# Patient Record
Sex: MALE | Race: WHITE | Employment: FULL TIME | URBAN - METROPOLITAN AREA
[De-identification: names, ages, dates, MRNs, and addresses within clinical notes are randomized per-mention and may not be internally consistent; named-entity substitution may affect disease eponyms.]

---

## 2023-06-11 ENCOUNTER — OFFICE VISIT (OUTPATIENT)
Dept: URGENT CARE | Facility: PHYSICIAN GROUP | Age: 23
End: 2023-06-11
Payer: COMMERCIAL

## 2023-06-11 VITALS
HEIGHT: 67 IN | BODY MASS INDEX: 30.61 KG/M2 | SYSTOLIC BLOOD PRESSURE: 126 MMHG | OXYGEN SATURATION: 96 % | DIASTOLIC BLOOD PRESSURE: 89 MMHG | WEIGHT: 195 LBS | RESPIRATION RATE: 14 BRPM | HEART RATE: 104 BPM | TEMPERATURE: 98.2 F

## 2023-06-11 DIAGNOSIS — R11.2 NAUSEA AND VOMITING, UNSPECIFIED VOMITING TYPE: ICD-10-CM

## 2023-06-11 DIAGNOSIS — R19.7 DIARRHEA, UNSPECIFIED TYPE: ICD-10-CM

## 2023-06-11 DIAGNOSIS — K52.9 AGE (ACUTE GASTROENTERITIS): ICD-10-CM

## 2023-06-11 PROCEDURE — 3074F SYST BP LT 130 MM HG: CPT

## 2023-06-11 PROCEDURE — 3079F DIAST BP 80-89 MM HG: CPT

## 2023-06-11 PROCEDURE — 99203 OFFICE O/P NEW LOW 30 MIN: CPT

## 2023-06-11 RX ORDER — ONDANSETRON 4 MG/1
4 TABLET, FILM COATED ORAL EVERY 6 HOURS PRN
Qty: 30 TABLET | Refills: 0 | Status: SHIPPED | OUTPATIENT
Start: 2023-06-11 | End: 2023-07-16

## 2023-06-11 ASSESSMENT — ENCOUNTER SYMPTOMS
COUGH: 0
CHILLS: 1
FEVER: 0
DIARRHEA: 1
SORE THROAT: 0
NUMBER OF EPISODES OF EMESIS TODAY: 1

## 2023-06-11 ASSESSMENT — FIBROSIS 4 INDEX: FIB4 SCORE: 0.27

## 2023-06-11 NOTE — PROGRESS NOTES
Monty Gresham is a 23 y.o. male who presents with Emesis (Last night at midnight felt sick; woke up early this morning had stomach issues. Throwing up until a few hours ago; head throbbing, throat irritated. In/out of being nauseous. )            Emesis  This is a new problem. The current episode started today. The problem occurs 2 to 4 times per day. The problem has been gradually worsening. Associated symptoms include chills. Pertinent negatives include no congestion, coughing, fever or sore throat. Nothing aggravates the symptoms. Treatments tried: zofran.   Diarrhea   This is a new problem. The current episode started today. The problem occurs 2 to 4 times per day. The problem has been gradually worsening. Associated symptoms include chills. Pertinent negatives include no coughing or fever. Nothing aggravates the symptoms. He has tried increased fluids for the symptoms.     Patient presents with symptoms that started this morning.  He had 4 episodes of diarrhea and 4 episodes of vomiting.  He describes diarrhea as watery, nonmucoid, and nonbloody.  He reports sick contacts, son has similar symptoms.  He denies any travel out of the country.    Patient's current problem list, medications, and past medical/surgical history were reviewed in Epic.    PMH:  has no past medical history on file.  MEDS: No current outpatient medications on file.  ALLERGIES: Not on File  SURGHX: No past surgical history on file.  SOCHX:  reports that he has never smoked. He has never used smokeless tobacco. He reports that he does not currently use alcohol. He reports current drug use. Drugs: Inhaled and Marijuana.  FH: Reviewed with patient, not pertinent to this visit.       Review of Systems   Constitutional:  Positive for chills. Negative for fever.   HENT:  Negative for congestion and sore throat.    Respiratory:  Negative for cough.    Gastrointestinal:  Positive for diarrhea.   All other systems reviewed and are  "negative.             Objective     BP (!) 126/90   Pulse (!) 104   Temp 36.8 °C (98.2 °F)   Resp 14   Ht 1.702 m (5' 7\")   Wt 88.5 kg (195 lb)   SpO2 96%   BMI 30.54 kg/m²      Physical Exam  Constitutional:       Appearance: Normal appearance.   HENT:      Head: Normocephalic.      Nose: Nose normal.   Eyes:      Extraocular Movements: Extraocular movements intact.   Cardiovascular:      Rate and Rhythm: Normal rate and regular rhythm.      Pulses: Normal pulses.      Heart sounds: Normal heart sounds.   Pulmonary:      Effort: Pulmonary effort is normal.      Breath sounds: Normal breath sounds.   Abdominal:      General: Abdomen is flat. Bowel sounds are normal.      Palpations: Abdomen is soft.      Tenderness: There is abdominal tenderness in the epigastric area.   Musculoskeletal:         General: Normal range of motion.      Cervical back: Normal range of motion.   Skin:     General: Skin is warm.   Neurological:      General: No focal deficit present.      Mental Status: He is alert.   Psychiatric:         Mood and Affect: Mood normal.         Behavior: Behavior normal.              Assessment & Plan        1. AGE (acute gastroenteritis)      2. Nausea and vomiting, unspecified vomiting type    - ondansetron (ZOFRAN) 4 MG Tab tablet; Take 1 Tablet by mouth every 6 hours as needed for Nausea/Vomiting.  Dispense: 30 Tablet; Refill: 0    3. Diarrhea, unspecified type    Patient's presentation is consistent with acute gastroenteritis most likely viral.  May take Zofran every 6 hours as needed for nausea and/or vomiting.  Educated on the importance of increasing oral fluid hydration to prevent dehydration from vomiting and diarrhea.  Educated importance of handwashing to prevent transmission of infection.   Discussed treatment plan with patient, he is agreeable and verbalized understanding.  Educated patient on signs and symptoms watch out for, when to return to the clinic or go to the ER.     Work note " given    Electronically Signed by EV Meredith

## 2023-06-11 NOTE — LETTER
"[facility] [place]  [address]  [place] [address]     June 11, 2023    Patient: Jose Gresham   YOB: 2000   Date of Visit: 6/11/2023       To Whom It May Concern:    Jose Gresham was seen and treated in our department on 6/11/2023.     Sincerely,     Nataliya Mcclure \"Rodrick\" EV oDrantes                   "

## 2023-07-16 ENCOUNTER — OFFICE VISIT (OUTPATIENT)
Dept: URGENT CARE | Facility: PHYSICIAN GROUP | Age: 23
End: 2023-07-16
Payer: COMMERCIAL

## 2023-07-16 VITALS
SYSTOLIC BLOOD PRESSURE: 116 MMHG | HEIGHT: 67 IN | RESPIRATION RATE: 16 BRPM | HEART RATE: 74 BPM | TEMPERATURE: 98.5 F | WEIGHT: 195 LBS | DIASTOLIC BLOOD PRESSURE: 74 MMHG | BODY MASS INDEX: 30.61 KG/M2 | OXYGEN SATURATION: 98 %

## 2023-07-16 DIAGNOSIS — J02.9 SORE THROAT: ICD-10-CM

## 2023-07-16 DIAGNOSIS — J06.9 VIRAL URI: ICD-10-CM

## 2023-07-16 LAB — S PYO DNA SPEC NAA+PROBE: NOT DETECTED

## 2023-07-16 PROCEDURE — 3074F SYST BP LT 130 MM HG: CPT | Performed by: NURSE PRACTITIONER

## 2023-07-16 PROCEDURE — 3078F DIAST BP <80 MM HG: CPT | Performed by: NURSE PRACTITIONER

## 2023-07-16 PROCEDURE — 99213 OFFICE O/P EST LOW 20 MIN: CPT | Performed by: NURSE PRACTITIONER

## 2023-07-16 PROCEDURE — 87651 STREP A DNA AMP PROBE: CPT | Performed by: NURSE PRACTITIONER

## 2023-07-16 ASSESSMENT — ENCOUNTER SYMPTOMS
SORE THROAT: 1
RESPIRATORY NEGATIVE: 1
SHORTNESS OF BREATH: 0
COUGH: 0
CARDIOVASCULAR NEGATIVE: 1
FEVER: 0
CHILLS: 1
NECK PAIN: 1

## 2023-07-16 ASSESSMENT — VISUAL ACUITY: OU: 1

## 2023-07-16 ASSESSMENT — FIBROSIS 4 INDEX: FIB4 SCORE: 0.27

## 2023-07-16 NOTE — LETTER
July 16, 2023         Patient: Jose Gresham   YOB: 2000   Date of Visit: 7/16/2023           To Whom it May Concern:    Jose Gresham was seen in my clinic on 7/16/2023 due to illness. Due to medical necessity, please excuse patient from work 7/16/2023.     If you have any questions or concerns, please don't hesitate to call.        Sincerely,           BALTA Garcia.  Electronically Signed

## 2023-07-16 NOTE — PROGRESS NOTES
"Subjective:     Jose Gresham is a 23 y.o. male who presents for Pharyngitis (Started last night, left ear pain )       Pharyngitis   This is a new problem. The current episode started yesterday. The problem has been gradually worsening. Associated symptoms include congestion, ear pain and neck pain. Pertinent negatives include no coughing or shortness of breath. He has tried nothing for the symptoms.     Denies concern for Covid.    Young daughter ill with fever and will be seen at other clinic.    Review of Systems   Constitutional:  Positive for chills. Negative for fever.   HENT:  Positive for congestion, ear pain and sore throat.    Respiratory: Negative.  Negative for cough and shortness of breath.    Cardiovascular: Negative.    Musculoskeletal:  Positive for neck pain.   All other systems reviewed and are negative.    Refer to HPI for additional details.    During this visit, appropriate PPE was worn, and hand hygiene was performed.    PMH:  has no past medical history on file.    MEDS: No current outpatient medications on file.    ALLERGIES: Not on File  SURGHX: History reviewed. No pertinent surgical history.  SOCHX:  reports that he has never smoked. He has never used smokeless tobacco. He reports that he does not currently use alcohol. He reports current drug use. Drugs: Inhaled and Marijuana.    FH: Per HPI as applicable/pertinent.      Objective:     /74 (BP Location: Right arm, Patient Position: Sitting, BP Cuff Size: Large adult)   Pulse 74   Temp 36.9 °C (98.5 °F) (Temporal)   Resp 16   Ht 1.702 m (5' 7\")   Wt 88.5 kg (195 lb)   SpO2 98%   BMI 30.54 kg/m²     Physical Exam  Nursing note reviewed.   Constitutional:       General: He is not in acute distress.     Appearance: He is well-developed. He is not ill-appearing or toxic-appearing.   HENT:      Right Ear: No mastoid tenderness. Tympanic membrane is not perforated, erythematous or bulging.      Left Ear: No mastoid tenderness. " Tympanic membrane is injected. Tympanic membrane is not perforated, erythematous or bulging.      Mouth/Throat:      Mouth: Mucous membranes are moist.      Pharynx: Uvula midline. Pharyngeal swelling and posterior oropharyngeal erythema present. No oropharyngeal exudate.   Eyes:      General: Vision grossly intact.   Cardiovascular:      Rate and Rhythm: Normal rate.   Pulmonary:      Effort: Pulmonary effort is normal. No respiratory distress.      Comments: Phonation normal, speaks in full sentences, no audible wheeze or stridor   Musculoskeletal:         General: No deformity. Normal range of motion.      Cervical back: Neck supple. Tenderness (Bilateral posterior, left anterior) present.   Lymphadenopathy:      Cervical: No cervical adenopathy.   Skin:     General: Skin is warm and dry.      Coloration: Skin is not pale.   Neurological:      Mental Status: He is alert and oriented to person, place, and time.      Motor: No weakness.   Psychiatric:         Behavior: Behavior normal. Behavior is cooperative.     POCT [employer] A Strep by PCR: negative      Assessment/Plan:     1. Sore throat  - POCT Cepheid [employer] A Strep - PCR    2. Viral URI    Discussed likely self-limiting viral etiology and expected course and duration of illness. Vital signs stable, afebrile, no acute distress at this time.     Differential diagnosis, natural history, emphasizing supportive care, rest, fluids, over-the-counter symptom management per 's instructions, ibuprofen, APAP, close monitoring, and indications for immediate follow-up discussed.     Warning signs reviewed. Return precautions discussed.     All questions answered. Patient agrees with the plan of care.    Discharge summary provided.    Work note provided.

## 2024-03-10 ENCOUNTER — OFFICE VISIT (OUTPATIENT)
Dept: URGENT CARE | Facility: PHYSICIAN GROUP | Age: 24
End: 2024-03-10
Payer: COMMERCIAL

## 2024-03-10 ENCOUNTER — HOSPITAL ENCOUNTER (EMERGENCY)
Facility: MEDICAL CENTER | Age: 24
End: 2024-03-10
Attending: EMERGENCY MEDICINE
Payer: COMMERCIAL

## 2024-03-10 ENCOUNTER — APPOINTMENT (OUTPATIENT)
Dept: RADIOLOGY | Facility: MEDICAL CENTER | Age: 24
End: 2024-03-10
Attending: EMERGENCY MEDICINE
Payer: COMMERCIAL

## 2024-03-10 VITALS
SYSTOLIC BLOOD PRESSURE: 127 MMHG | TEMPERATURE: 97.8 F | BODY MASS INDEX: 31.04 KG/M2 | HEIGHT: 67 IN | HEART RATE: 75 BPM | WEIGHT: 197.75 LBS | DIASTOLIC BLOOD PRESSURE: 68 MMHG | RESPIRATION RATE: 20 BRPM | OXYGEN SATURATION: 95 %

## 2024-03-10 VITALS
DIASTOLIC BLOOD PRESSURE: 70 MMHG | BODY MASS INDEX: 32.02 KG/M2 | RESPIRATION RATE: 18 BRPM | WEIGHT: 204 LBS | TEMPERATURE: 98.2 F | OXYGEN SATURATION: 95 % | HEART RATE: 91 BPM | SYSTOLIC BLOOD PRESSURE: 118 MMHG | HEIGHT: 67 IN

## 2024-03-10 DIAGNOSIS — R06.02 SOB (SHORTNESS OF BREATH): ICD-10-CM

## 2024-03-10 DIAGNOSIS — Z82.49 FAMILY HISTORY OF AORTIC VALVE DISORDER: ICD-10-CM

## 2024-03-10 DIAGNOSIS — R07.89 CHEST TIGHTNESS: ICD-10-CM

## 2024-03-10 DIAGNOSIS — R68.84 JAW PAIN: ICD-10-CM

## 2024-03-10 LAB
ALBUMIN SERPL BCP-MCNC: 4.6 G/DL (ref 3.2–4.9)
ALBUMIN/GLOB SERPL: 1.5 G/DL
ALP SERPL-CCNC: 73 U/L (ref 30–99)
ALT SERPL-CCNC: 29 U/L (ref 2–50)
ANION GAP SERPL CALC-SCNC: 11 MMOL/L (ref 7–16)
AST SERPL-CCNC: 28 U/L (ref 12–45)
BASOPHILS # BLD AUTO: 0.4 % (ref 0–1.8)
BASOPHILS # BLD: 0.02 K/UL (ref 0–0.12)
BILIRUB SERPL-MCNC: 0.5 MG/DL (ref 0.1–1.5)
BUN SERPL-MCNC: 14 MG/DL (ref 8–22)
CALCIUM ALBUM COR SERPL-MCNC: 8.6 MG/DL (ref 8.5–10.5)
CALCIUM SERPL-MCNC: 9.1 MG/DL (ref 8.5–10.5)
CHLORIDE SERPL-SCNC: 106 MMOL/L (ref 96–112)
CO2 SERPL-SCNC: 22 MMOL/L (ref 20–33)
CREAT SERPL-MCNC: 0.83 MG/DL (ref 0.5–1.4)
EKG IMPRESSION: NORMAL
EOSINOPHIL # BLD AUTO: 0.1 K/UL (ref 0–0.51)
EOSINOPHIL NFR BLD: 2.1 % (ref 0–6.9)
ERYTHROCYTE [DISTWIDTH] IN BLOOD BY AUTOMATED COUNT: 36.6 FL (ref 35.9–50)
GFR SERPLBLD CREATININE-BSD FMLA CKD-EPI: 124 ML/MIN/1.73 M 2
GLOBULIN SER CALC-MCNC: 3 G/DL (ref 1.9–3.5)
GLUCOSE SERPL-MCNC: 93 MG/DL (ref 65–99)
HCT VFR BLD AUTO: 44.5 % (ref 42–52)
HGB BLD-MCNC: 16.4 G/DL (ref 14–18)
IMM GRANULOCYTES # BLD AUTO: 0.01 K/UL (ref 0–0.11)
IMM GRANULOCYTES NFR BLD AUTO: 0.2 % (ref 0–0.9)
LYMPHOCYTES # BLD AUTO: 1.68 K/UL (ref 1–4.8)
LYMPHOCYTES NFR BLD: 35 % (ref 22–41)
MCH RBC QN AUTO: 31.4 PG (ref 27–33)
MCHC RBC AUTO-ENTMCNC: 36.9 G/DL (ref 32.3–36.5)
MCV RBC AUTO: 85.1 FL (ref 81.4–97.8)
MONOCYTES # BLD AUTO: 0.34 K/UL (ref 0–0.85)
MONOCYTES NFR BLD AUTO: 7.1 % (ref 0–13.4)
NEUTROPHILS # BLD AUTO: 2.65 K/UL (ref 1.82–7.42)
NEUTROPHILS NFR BLD: 55.2 % (ref 44–72)
NRBC # BLD AUTO: 0 K/UL
NRBC BLD-RTO: 0 /100 WBC (ref 0–0.2)
NT-PROBNP SERPL IA-MCNC: 84 PG/ML (ref 0–125)
PLATELET # BLD AUTO: 239 K/UL (ref 164–446)
PMV BLD AUTO: 9.6 FL (ref 9–12.9)
POTASSIUM SERPL-SCNC: 4 MMOL/L (ref 3.6–5.5)
PROT SERPL-MCNC: 7.6 G/DL (ref 6–8.2)
RBC # BLD AUTO: 5.23 M/UL (ref 4.7–6.1)
SODIUM SERPL-SCNC: 139 MMOL/L (ref 135–145)
TROPONIN T SERPL-MCNC: <6 NG/L (ref 6–19)
WBC # BLD AUTO: 4.8 K/UL (ref 4.8–10.8)

## 2024-03-10 PROCEDURE — 99283 EMERGENCY DEPT VISIT LOW MDM: CPT

## 2024-03-10 PROCEDURE — 99215 OFFICE O/P EST HI 40 MIN: CPT

## 2024-03-10 PROCEDURE — 71045 X-RAY EXAM CHEST 1 VIEW: CPT

## 2024-03-10 PROCEDURE — 93005 ELECTROCARDIOGRAM TRACING: CPT

## 2024-03-10 PROCEDURE — 80053 COMPREHEN METABOLIC PANEL: CPT

## 2024-03-10 PROCEDURE — 3078F DIAST BP <80 MM HG: CPT

## 2024-03-10 PROCEDURE — 36415 COLL VENOUS BLD VENIPUNCTURE: CPT

## 2024-03-10 PROCEDURE — 3074F SYST BP LT 130 MM HG: CPT

## 2024-03-10 PROCEDURE — 83880 ASSAY OF NATRIURETIC PEPTIDE: CPT

## 2024-03-10 PROCEDURE — 93005 ELECTROCARDIOGRAM TRACING: CPT | Performed by: EMERGENCY MEDICINE

## 2024-03-10 PROCEDURE — 84484 ASSAY OF TROPONIN QUANT: CPT

## 2024-03-10 PROCEDURE — 85025 COMPLETE CBC W/AUTO DIFF WBC: CPT

## 2024-03-10 ASSESSMENT — ENCOUNTER SYMPTOMS
COUGH: 0
CONSTIPATION: 1
PALPITATIONS: 0
VOMITING: 1
NAUSEA: 1
SHORTNESS OF BREATH: 1
DIARRHEA: 0
FEVER: 0
HEADACHES: 1
HEARTBURN: 1

## 2024-03-10 ASSESSMENT — FIBROSIS 4 INDEX
FIB4 SCORE: 0.28
FIB4 SCORE: 0.28

## 2024-03-10 NOTE — ED TRIAGE NOTES
"Chief Complaint   Patient presents with    Sent from Urgent Care     Chest tightness, SOB, jaw pain since yesterday morning.     Chest Pain    Shortness of Breath     Ambulatory to triage for above. Denies CP currently. Reports it is a tightness.     BP (!) 155/91   Pulse 92   Temp 36.6 °C (97.8 °F) (Temporal)   Resp 16   Ht 1.702 m (5' 7\")   Wt 89.7 kg (197 lb 12 oz)   SpO2 97%   BMI 30.97 kg/m²     "

## 2024-03-10 NOTE — PROGRESS NOTES
Subjective:     CHIEF COMPLAINT  Chief Complaint   Patient presents with    Ear Pain     Right x 1 day     Chest Pain     No pain,just tightness        HPI  Jose Gersham is a very pleasant 24 y.o. male who presents with chest tightness, shortness of breath, right-sided jaw pain, and left arm discomfort for the past 24 hours.  He reports that his symptoms were preceded by eating fried chicken and he does have a history of heartburn.  He tried taking Pepto-Bismol without improvement in symptoms. He states that his heartburn/indigestion typically does not last this long.  He has also tried taking Tylenol for his jaw pain without improvement.  He denies any other events that preceded the onset of the symptoms.  He denies any leg swelling or palpitations.  He has a family history of cardiovascular disease.  His father  at age 51 related to an aortic valve abnormality.  His uncle  at age 43 of cardiac origins and his grandfather  at age 56 of cardiac issues as well.  Patient denies a history of high blood pressure, hyperlipidemia, diabetes, or smoking.      REVIEW OF SYSTEMS  Review of Systems   Constitutional:  Negative for fever.   Respiratory:  Positive for shortness of breath. Negative for cough.    Cardiovascular:  Positive for chest pain (Chest tightness). Negative for palpitations and leg swelling.   Gastrointestinal:  Positive for constipation, heartburn, nausea and vomiting. Negative for diarrhea.   Neurological:  Positive for headaches (R temporal region).       PAST MEDICAL HISTORY  There are no problems to display for this patient.      SURGICAL HISTORY  patient denies any surgical history    ALLERGIES  No Known Allergies    CURRENT MEDICATIONS  Home Medications       Reviewed by Katty Carreon P.A.-C. (Physician Assistant) on 03/10/24 at 0934  Med List Status: <None>     Medication Last Dose Status        Patient Antonino Taking any Medications                           SOCIAL HISTORY  Social  "History     Tobacco Use    Smoking status: Never    Smokeless tobacco: Never   Vaping Use    Vaping Use: Former    Substances: Nicotine   Substance and Sexual Activity    Alcohol use: Not Currently    Drug use: Yes     Types: Inhaled, Marijuana    Sexual activity: Not on file       FAMILY HISTORY  History reviewed. No pertinent family history.       Objective:     VITAL SIGNS: /70 (BP Location: Right arm, Patient Position: Sitting, BP Cuff Size: Adult)   Pulse 91   Temp 36.8 °C (98.2 °F) (Temporal)   Resp 18   Ht 1.702 m (5' 7\")   Wt 92.5 kg (204 lb)   SpO2 95%   BMI 31.95 kg/m²     PHYSICAL EXAM  Physical Exam  Vitals reviewed. Exam conducted with a chaperone present.   Constitutional:       General: He is not in acute distress.     Appearance: Normal appearance. He is not ill-appearing, toxic-appearing or diaphoretic.   HENT:      Head: Normocephalic and atraumatic.      Right Ear: Tympanic membrane, ear canal and external ear normal.      Mouth/Throat:      Mouth: Mucous membranes are moist.   Eyes:      Conjunctiva/sclera: Conjunctivae normal.   Cardiovascular:      Rate and Rhythm: Normal rate and regular rhythm.      Pulses:           Radial pulses are 2+ on the right side and 2+ on the left side.      Heart sounds: Normal heart sounds, S1 normal and S2 normal. No murmur heard.     No friction rub. No gallop.      Comments: No JVD  Pulmonary:      Effort: Pulmonary effort is normal. No respiratory distress.      Breath sounds: Normal breath sounds. No stridor. No wheezing, rhonchi or rales.   Musculoskeletal:      Right lower leg: No edema.      Left lower leg: No edema.   Skin:     General: Skin is warm and dry.      Capillary Refill: Capillary refill takes less than 2 seconds.      Coloration: Skin is not pale.   Neurological:      General: No focal deficit present.      Mental Status: He is alert.   Psychiatric:         Mood and Affect: Mood normal.         Assessment/Plan:     1. Chest " tightness    2. Jaw pain    3. SOB (shortness of breath)  -Patient referred to emergency department for further investigation and management    MDM/Comments:  Patient has stable vital signs and is non-toxic appearing.  Discussed that patient's symptoms cannot be safely ruled out as being cardiac in origin in an urgent care setting.  Patient has politely declined having an EKG performed in the office today and said he would just have it done in the emergency department.  [facility] transfer Cooper called ahead to inform of impending transfer.  Patient is in no acute distress at this time and is stable to be transported via private vehicle driven by his partner.  Patient demonstrated understanding of treatment plan at this time.      Differential diagnosis, natural history, supportive care, and indications for immediate follow-up discussed. All questions answered. Patient agrees with the plan of care.    Follow-up as needed if symptoms worsen or fail to improve to PCP, Urgent care or Emergency Room.    I have personally reviewed prior external notes and test results pertinent to today's visit.  I have independently reviewed and interpreted all diagnostics ordered during this urgent care acute visit.   Discussed management options (risks,benefits, and alternatives to treatment). Pt expresses understanding and the treatment plan was agreed upon. Questions were encouraged and answered to pt's satisfaction.    Please note that this dictation was created using voice recognition software. I have made a reasonable attempt to correct obvious errors, but I expect that there are errors of grammar and possibly content that I did not discover before finalizing the note.

## 2024-03-10 NOTE — LETTER
March 10, 2024    To Whom It May Concern:         This is confirmation that Jose Gresham attended his scheduled appointment with Katty Carreon P.A.-C. on 3/10/24. Please excuse his absence from work today.          If you have any questions please do not hesitate to call me at the phone number listed below.    Sincerely,          Katty Carreon P.A.-C.  [phone]

## 2024-03-10 NOTE — ED PROVIDER NOTES
Emergency Physician Note    Chief Concern:  Chief Complaint   Patient presents with    Sent from Urgent Care     Chest tightness, SOB, jaw pain since yesterday morning.     Chest Pain    Shortness of Breath     HPI/ROS     External Records Reviewed:  Outpatient clinic note reviewed: Mr. Gresham was seen in urgent care earlier today.  Physician assistant note reviewed from that visit.  He presented to urgent care reporting chest tightness, right-sided jaw pain, and left arm discomfort for 24 hours.  Reported no personal history of cardiovascular disease, but reported a strong family history of cardiovascular disease including uncle and grandfather who  of cardiac disease in their 40s and 50s respectively.  He was sent to the emergency department for further evaluation.    HPI:  Jose Gresham is a 24 y.o. male who presents to the emergency department today for evaluation of chest discomfort.  Symptoms began about 24 hours ago, he describes a sensation consistent with chest tightness, and did notice some pain radiating to the right side of the jaw.  Yesterday he had some pain that seem to radiate towards the left arm, but states he has had similar left arm discomfort in the past.  He did not notice an exertional component, did have some mild associated shortness of breath.  Chest discomfort persisted throughout the day today prompting him to go to urgent care.  He has not had any stuttering symptoms, no history of previous similar symptoms.    He reports family history of cardiac complications, he had 1 family member who  of COVID related cardiac complications, another family member with what sounds like an aortic dissection or aneurysm rupture, and another who  of cancer related cardiac complications.  He reports a history of aortic abnormality in his family, has never been screened for this, but states that multiple family members have been diagnosed, including his brother.  He is uncertain as to the specific  "condition, but what he describes sounds as though it may be a bicuspid aortic valve.    He has had no recent fevers, no cough, no congestion.  On review of systems he has had some pain localized to the right ear radiating to the right jaw, which is initially what he attributed his symptoms to.  He does not have any severe ear pain, no drainage from the ear, no other upper respiratory symptoms.  He reports no recent immobilization, no leg pain or leg swelling, does not report any history of DVT nor pulmonary embolism.    PAST MEDICAL HISTORY  History reviewed. No pertinent past medical history.    SURGICAL HISTORY  History reviewed. No pertinent surgical history.    FAMILY HISTORY  No family history on file.    SOCIAL HISTORY   reports that he has never smoked. He has never used smokeless tobacco. He reports that he does not currently use alcohol. He reports current drug use. Drugs: Inhaled and Marijuana.    CURRENT MEDICATIONS  There are no discharge medications for this patient.      ALLERGIES  Patient has no known allergies.    PHYSICAL EXAM  Vital Signs: /68   Pulse 75   Temp 36.6 °C (97.8 °F)   Resp 20   Ht 1.702 m (5' 7\")   Wt 89.7 kg (197 lb 12 oz)   SpO2 95%   BMI 30.97 kg/m²   Constitutional: Alert, no acute distress  HENT: Normocephalic, atraumatic.  Cardiovascular: No tachycardia, regular rate and rhythm, no murmur appreciated  Pulmonary: No respiratory distress, normal work of breathing, breath sounds clear and equal bilaterally  Abdomen: Soft, non tender, no peritoneal signs.  Skin: Warm, dry, no rashes or lesions  Musculoskeletal: Normal range of motion in all extremities, no swelling or deformity noted  Neurologic: Alert, oriented, normal motor function, no speech deficits    Diagnostic Studies & Procedures    Labs:  All labs reviewed by me as noted below.    EK Lead EKG interpreted by me as noted below  Results for orders placed or performed during the hospital encounter of 03/10/24 "   EKG   Result Value Ref Range    Report       [facility].    Test Date:  2024-03-10  Pt Name:    SAMANTHA FORREST                  Department: ER  MRN:        [id number]                      Room:  Gender:     Male                         Technician: [id number]  :        2000                   Requested By:ER TRIAGE PROTOCOL  Order #:    [id number]                    Reading MD: NICOLE JOSHUA MD    Measurements  Intervals                                Axis  Rate:       96                           P:          83  NJ:         135                          QRS:        60  QRSD:       98                           T:          -5  QT:         358  QTc:        453    Interpretive Statements  Rate 96, sinus rhythm, no ST elevation or depression, no delta waves, slight  T wave inversion and flattening in leads III and aVF, no prior EKG available  for comparison.  Electronically Signed On 03- 12:43:49 PDT by NICOLE JOSHUA MD         Radiology:  The attending Emergency Physician has independently interpreted the following imaging:  I independently interpreted the chest x-ray, normal-appearing mediastinum, no cardiomegaly present.    DX-CHEST-PORTABLE (1 VIEW)   Final Result      No evidence of acute cardiopulmonary process.          Course and Medical Decision Making    Initial Assessment and Plan:  Mr. Hayden presents to the emergency department today for evaluation of chest discomfort with some pain rating towards the jaw.  He also reports some pain initially localized to the ear which seem to be related to his jaw pain.  He reports some discomfort radiating towards the left arm as well, though not necessarily related to his chest pain, and he reports the left arm pain has happened in the past without any associated chest symptoms.  His vital signs are reassuring on arrival, he is not having any stuttering pain, no worsening pain, no diaphoresis.  He does not report any thoracic  back pain.  Of concern is his significant family history of cardiac disease, though it sounds as though family history is of aortic and valvular disease, not acute coronary syndrome, and it does not sound as though he has a past family history significant for familial hypercholesterolemia.  He is uncertain as to the exact name of the condition in his family, though by his description it sounds as though it may be a bicuspid aortic valve.    He was seen at urgent care and sent to the emergency department for further evaluation.  On arrival he is PERC negative, with no risk factors for pulmonary embolism, do not believe further evaluation for PE is necessary.  EKG is reassuring without evidence of acute ischemic change.  He has no abnormalities on CBC, no evidence of symptomatic anemia.  proBNP is within normal limits, no evidence of new onset fluid overload or heart failure.  High-sensitivity troponin is negative, with ongoing symptoms for 24 hours that have not changed, do not believe 2-hour repeat troponin is necessary, do not believe his symptoms represent myocardial injury.  CMP does not show any signs of electrolyte abnormality.    He is not currently established with a primary care physician, I discussed his presentation with Dr. Marcos, cardiologist, regarding his family history to see if there were any additional recommendations beyond screening echocardiogram.  At this time, Dr. Marcos is in agreement that the patient does not require any inpatient diagnostics nor treatment, nor does he require cardiology referral at this time.  He may follow-up with primary care for cardiac screening, with referral if any abnormal findings identified.    Follow-up order was placed for primary care, he was provided with follow-up information to schedule a primary care appointment as well.  He remained well-appearing in the emergency department with no new or worsening symptoms.  Heart score is 1 for family history risk,  indicating low risk of major adverse cardiac event.  Do believe he is safe for discharge home with low risk chest pain. Return precautions were discussed with the patient, and provided in written form with the patient's discharge instructions.       Additional Problems and Disposition    I have discussed management of the patient with the following physicians:   Dr. Marcos, Cardiologist    Escalation of care considered, and ultimately not performed:   D-dimer and CTA imaging initially considered, however patient is PERC negative, he has a low pretest probability for pulmonary embolism, do not believe escalation to include further blood work and imaging is necessary at this time.  Hospitalization was initially considered, however given reassuring lab work and imaging, heart score of 1, and patient with minimal cardiac risk factors, and no red flag symptoms for aortic dissection, do not believe hospitalization is necessary at this time.    Decision tools utilized including but not limited to:  PERC - negative  HEART score - 1, low risk of major adverse cardiac event    Disposition:  Discharged in stable condition    FINAL IMPRESSION   1. Chest tightness    2. Family history of aortic valve disorder        FOLLOW UP:  [facility]  [address] [facility] [address]  [phone]  Schedule an appointment as soon as possible for a visit       [place] [address] [facility] [address]  [phone]  Schedule an appointment as soon as possible for a visit       [facility]  [address] [place] [address] [address]  [facility] [address]  [phone]        [facility], Emergency Dept  [address] [facility] [id number] [phone]  Go to   If symptoms worsen

## 2024-03-10 NOTE — DISCHARGE INSTRUCTIONS
Please call your primary care doctor tomorrow for complete recheck within 24 hours.  Return to the emergency department if you develop any new or worsening symptoms including recurrent chest pain, shortness of breath, fevers, cough, leg pain or leg swelling, lightheadedness, nausea, or any further concerns.  If your symptoms have not completely improved within 8-12 hours and you are not able to follow-up with primary care, please return to the emergency department for complete recheck.

## 2024-03-11 ENCOUNTER — PATIENT OUTREACH (OUTPATIENT)
Dept: SCHEDULING | Facility: IMAGING CENTER | Age: 24
End: 2024-03-11
Payer: COMMERCIAL

## 2024-03-12 ENCOUNTER — PATIENT OUTREACH (OUTPATIENT)
Dept: SCHEDULING | Facility: IMAGING CENTER | Age: 24
End: 2024-03-12
Payer: COMMERCIAL

## 2024-03-13 ENCOUNTER — PATIENT OUTREACH (OUTPATIENT)
Dept: SCHEDULING | Facility: IMAGING CENTER | Age: 24
End: 2024-03-13
Payer: COMMERCIAL

## 2024-04-16 ENCOUNTER — OFFICE VISIT (OUTPATIENT)
Dept: URGENT CARE | Facility: PHYSICIAN GROUP | Age: 24
End: 2024-04-16
Payer: COMMERCIAL

## 2024-04-16 VITALS
WEIGHT: 197 LBS | HEART RATE: 98 BPM | OXYGEN SATURATION: 100 % | HEIGHT: 67 IN | RESPIRATION RATE: 16 BRPM | SYSTOLIC BLOOD PRESSURE: 118 MMHG | DIASTOLIC BLOOD PRESSURE: 80 MMHG | TEMPERATURE: 98 F | BODY MASS INDEX: 30.92 KG/M2

## 2024-04-16 DIAGNOSIS — J06.9 UPPER RESPIRATORY TRACT INFECTION, UNSPECIFIED TYPE: ICD-10-CM

## 2024-04-16 LAB
FLUAV RNA SPEC QL NAA+PROBE: NEGATIVE
FLUBV RNA SPEC QL NAA+PROBE: NEGATIVE
RSV RNA SPEC QL NAA+PROBE: NEGATIVE
SARS-COV-2 RNA RESP QL NAA+PROBE: NEGATIVE

## 2024-04-16 PROCEDURE — 99213 OFFICE O/P EST LOW 20 MIN: CPT

## 2024-04-16 PROCEDURE — 0241U POCT CEPHEID COV-2, FLU A/B, RSV - PCR: CPT

## 2024-04-16 PROCEDURE — 3079F DIAST BP 80-89 MM HG: CPT

## 2024-04-16 PROCEDURE — 3074F SYST BP LT 130 MM HG: CPT

## 2024-04-16 ASSESSMENT — FIBROSIS 4 INDEX: FIB4 SCORE: 0.57

## 2024-04-16 NOTE — PROGRESS NOTES
"Chief Complaint   Patient presents with    Coronavirus Screening     Exposed to COVID   Sore throat, headache, onset yesterday            Subjective:   HISTORY OF PRESENT ILLNESS: Jose Gresham is a 24 y.o. male who presents for sore throat, cough, nasal congestion and covid exposure.  He has had a headache all day today.    Patient denies fevers, SOB.  Would like covid screening, his wife is pregnant    Medications, Allergies, current problem list, Social and Family history reviewed today in Epic.     Objective:     /80 (BP Location: Right arm, Patient Position: Sitting, BP Cuff Size: Adult)   Pulse 98   Temp 36.7 °C (98 °F) (Temporal)   Resp 16   Ht 1.702 m (5' 7\")   Wt 89.4 kg (197 lb)   SpO2 100%     Physical Exam  Vitals reviewed.   Constitutional:       Appearance: Normal appearance. He is not toxic-appearing.   HENT:      Head:      Jaw: No trismus.      Right Ear: Tympanic membrane normal.      Left Ear: Tympanic membrane normal.      Nose: Rhinorrhea present.      Mouth/Throat:      Mouth: Mucous membranes are moist.      Pharynx: Uvula midline. Posterior oropharyngeal erythema present. No pharyngeal swelling, oropharyngeal exudate or uvula swelling.      Tonsils: No tonsillar exudate or tonsillar abscesses. 1+ on the right. 1+ on the left.      Comments: No muffled voice, trismus, unilateral deviation of the uvula, soft palate fullness or edema. No oral airway compromise, or drooling noted.   Eyes:      Conjunctiva/sclera: Conjunctivae normal.   Cardiovascular:      Rate and Rhythm: Normal rate and regular rhythm.      Heart sounds: Normal heart sounds.   Pulmonary:      Effort: Pulmonary effort is normal. No respiratory distress.      Breath sounds: Normal breath sounds. No decreased breath sounds or wheezing.   Musculoskeletal:      Cervical back: Full passive range of motion without pain and neck supple.   Skin:     General: Skin is warm and dry.   Neurological:      Mental Status: He is alert " and oriented to person, place, and time.   Psychiatric:         Mood and Affect: Mood normal.          Assessment/Plan:     Diagnosis and associated orders    I personally reviewed prior external notes and test results pertinent to today's visit.     1. Upper respiratory tract infection, unspecified type  POCT CoV-2, Flu A/B, RSV by PCR            IMPRESSION:  Pt has stable vital signs and no red flag symptoms or exam findings identified.   Informed pt that symptoms are consistent with a viral illness and are self limiting.  Informed that no antibiotics are indicated at this time.  Educated on duration of illness and indications to RTC.  Recommended supportive therapies and preferred OTC medications for symptomatic relief .  Advised to rest an dpush oral fluids    Differential diagnosis discussed. Pt was Educated on red flag symptoms. Pt has been Instructed to return to Urgent Care or nearest Emergency Department if symptoms fail to improve, for any change in condition, further concerns, or new concerning symptoms. Patient states understanding of the plan of care and discharge instructions.  They are discharged in stable condition.         Please note that this dictation was created using voice recognition software. I have made a reasonable attempt to correct obvious errors, but I expect that there are errors of grammar and possibly content that I did not discover before finalizing the note.    This note was electronically signed by FABY Bauman

## 2024-12-28 ENCOUNTER — HOSPITAL ENCOUNTER (EMERGENCY)
Facility: MEDICAL CENTER | Age: 24
End: 2024-12-28
Attending: EMERGENCY MEDICINE
Payer: COMMERCIAL

## 2024-12-28 ENCOUNTER — APPOINTMENT (OUTPATIENT)
Dept: RADIOLOGY | Facility: MEDICAL CENTER | Age: 24
End: 2024-12-28
Attending: EMERGENCY MEDICINE
Payer: COMMERCIAL

## 2024-12-28 VITALS
HEART RATE: 76 BPM | DIASTOLIC BLOOD PRESSURE: 77 MMHG | RESPIRATION RATE: 18 BRPM | BODY MASS INDEX: 30.93 KG/M2 | TEMPERATURE: 98.3 F | SYSTOLIC BLOOD PRESSURE: 120 MMHG | HEIGHT: 67 IN | WEIGHT: 197.09 LBS | OXYGEN SATURATION: 95 %

## 2024-12-28 DIAGNOSIS — M25.462 SWELLING OF JOINT, KNEE, LEFT: ICD-10-CM

## 2024-12-28 DIAGNOSIS — I83.90 ASYMPTOMATIC VARICOSE VEINS: ICD-10-CM

## 2024-12-28 PROCEDURE — 99283 EMERGENCY DEPT VISIT LOW MDM: CPT

## 2024-12-28 PROCEDURE — 93971 EXTREMITY STUDY: CPT | Mod: LT

## 2024-12-28 RX ORDER — KETOROLAC TROMETHAMINE 10 MG/1
10 TABLET, FILM COATED ORAL 3 TIMES DAILY PRN
Qty: 15 TABLET | Refills: 0 | Status: SHIPPED | OUTPATIENT
Start: 2024-12-28

## 2024-12-28 ASSESSMENT — FIBROSIS 4 INDEX: FIB4 SCORE: 0.57

## 2024-12-28 NOTE — ED NOTES
Discharge instructions provided.  Pt verbalized the understanding of discharge instructions to follow up with PCP and to return to ER if condition worsens.  Pt ambulated out of ER without difficulty.    [phone]

## 2024-12-28 NOTE — ED TRIAGE NOTES
"Patient presents to the ER with the following complaints:    Chief Complaint   Patient presents with    Knee Swelling     Left knee localized swelling spots that are skin colored without redness or warmth. Patient states they go up and down throughout the day.        BP (!) 153/94   Pulse 90   Temp 36.8 °C (98.3 °F) (Temporal)   Resp 18   Ht 1.702 m (5' 7\")   Wt 89.4 kg (197 lb 1.5 oz)   SpO2 97%   BMI 30.87 kg/m²       "

## 2024-12-28 NOTE — DISCHARGE INSTRUCTIONS
Call [facility] first thing Monday morning to schedule an appointment regarding the swelling in your knee.  The ultrasound of your leg showed no blood clot, you have a venous varicosity which is not an emergency.  Just apply warm compresses to the area as needed and take anti-inflammatories.

## 2024-12-28 NOTE — ED NOTES
ERP at bedside. Pt agrees with plan of care discussed by ERP. CIDET acknowledged with patient. Ana in low position, side rail up for pt safety. Call light within reach. Will continue to monitor.